# Patient Record
Sex: MALE | Race: WHITE | Employment: OTHER | URBAN - NONMETROPOLITAN AREA
[De-identification: names, ages, dates, MRNs, and addresses within clinical notes are randomized per-mention and may not be internally consistent; named-entity substitution may affect disease eponyms.]

---

## 2020-02-25 ENCOUNTER — APPOINTMENT (OUTPATIENT)
Dept: ULTRASOUND IMAGING | Age: 35
End: 2020-02-25

## 2020-02-25 ENCOUNTER — ANESTHESIA (OUTPATIENT)
Dept: OPERATING ROOM | Age: 35
End: 2020-02-25

## 2020-02-25 ENCOUNTER — HOSPITAL ENCOUNTER (OUTPATIENT)
Age: 35
Setting detail: OBSERVATION
Discharge: HOME | End: 2020-02-26
Attending: EMERGENCY MEDICINE | Admitting: SURGERY

## 2020-02-25 ENCOUNTER — ANESTHESIA EVENT (OUTPATIENT)
Dept: OPERATING ROOM | Age: 35
End: 2020-02-25

## 2020-02-25 ENCOUNTER — APPOINTMENT (OUTPATIENT)
Dept: CT IMAGING | Age: 35
End: 2020-02-25

## 2020-02-25 VITALS
SYSTOLIC BLOOD PRESSURE: 119 MMHG | OXYGEN SATURATION: 99 % | TEMPERATURE: 99 F | DIASTOLIC BLOOD PRESSURE: 64 MMHG | RESPIRATION RATE: 11 BRPM

## 2020-02-25 PROBLEM — K40.30 INGUINAL HERNIA OF LEFT SIDE WITH OBSTRUCTION: Status: ACTIVE | Noted: 2020-02-25

## 2020-02-25 LAB
ALBUMIN SERPL-MCNC: 5 G/DL (ref 3.5–5.1)
ALP BLD-CCNC: 103 U/L (ref 38–126)
ALT SERPL-CCNC: 19 U/L (ref 11–66)
ANION GAP SERPL CALCULATED.3IONS-SCNC: 17 MEQ/L (ref 8–16)
AST SERPL-CCNC: 20 U/L (ref 5–40)
BACTERIA: ABNORMAL /HPF
BASOPHILS # BLD: 0.2 %
BASOPHILS ABSOLUTE: 0 THOU/MM3 (ref 0–0.1)
BILIRUB SERPL-MCNC: 1 MG/DL (ref 0.3–1.2)
BILIRUBIN URINE: NEGATIVE
BLOOD, URINE: NEGATIVE
BUN BLDV-MCNC: 20 MG/DL (ref 7–22)
CALCIUM SERPL-MCNC: 10.5 MG/DL (ref 8.5–10.5)
CASTS 2: ABNORMAL /LPF
CASTS UA: ABNORMAL /LPF
CHARACTER, URINE: CLEAR
CHLORIDE BLD-SCNC: 95 MEQ/L (ref 98–111)
CO2: 28 MEQ/L (ref 23–33)
COLOR: ABNORMAL
CREAT SERPL-MCNC: 0.9 MG/DL (ref 0.4–1.2)
CRYSTALS, UA: ABNORMAL
EKG ATRIAL RATE: 72 BPM
EKG P AXIS: 53 DEGREES
EKG P-R INTERVAL: 130 MS
EKG Q-T INTERVAL: 374 MS
EKG QRS DURATION: 84 MS
EKG QTC CALCULATION (BAZETT): 409 MS
EKG R AXIS: 70 DEGREES
EKG T AXIS: 40 DEGREES
EKG VENTRICULAR RATE: 72 BPM
EOSINOPHIL # BLD: 0.2 %
EOSINOPHILS ABSOLUTE: 0 THOU/MM3 (ref 0–0.4)
EPITHELIAL CELLS, UA: ABNORMAL /HPF
ERYTHROCYTE [DISTWIDTH] IN BLOOD BY AUTOMATED COUNT: 10.8 % (ref 11.5–14.5)
ERYTHROCYTE [DISTWIDTH] IN BLOOD BY AUTOMATED COUNT: 36.9 FL (ref 35–45)
GFR SERPL CREATININE-BSD FRML MDRD: > 90 ML/MIN/1.73M2
GLUCOSE BLD-MCNC: 114 MG/DL (ref 70–108)
GLUCOSE URINE: NEGATIVE MG/DL
HCT VFR BLD CALC: 52.8 % (ref 42–52)
HEMOGLOBIN: 17.4 GM/DL (ref 14–18)
IMMATURE GRANS (ABS): 0.02 THOU/MM3 (ref 0–0.07)
IMMATURE GRANULOCYTES: 0.2 %
KETONES, URINE: 40
LEUKOCYTE ESTERASE, URINE: NEGATIVE
LIPASE: 21.5 U/L (ref 5.6–51.3)
LYMPHOCYTES # BLD: 11.7 %
LYMPHOCYTES ABSOLUTE: 1 THOU/MM3 (ref 1–4.8)
MCH RBC QN AUTO: 31 PG (ref 26–33)
MCHC RBC AUTO-ENTMCNC: 33 GM/DL (ref 32.2–35.5)
MCV RBC AUTO: 94.1 FL (ref 80–94)
MISCELLANEOUS 2: ABNORMAL
MONOCYTES # BLD: 7.3 %
MONOCYTES ABSOLUTE: 0.6 THOU/MM3 (ref 0.4–1.3)
NITRITE, URINE: NEGATIVE
NUCLEATED RED BLOOD CELLS: 0 /100 WBC
OSMOLALITY CALCULATION: 282.9 MOSMOL/KG (ref 275–300)
PH UA: 6.5 (ref 5–9)
PLATELET # BLD: 299 THOU/MM3 (ref 130–400)
PMV BLD AUTO: 9.7 FL (ref 9.4–12.4)
POTASSIUM REFLEX MAGNESIUM: 4.4 MEQ/L (ref 3.5–5.2)
PROTEIN UA: ABNORMAL
RBC # BLD: 5.61 MILL/MM3 (ref 4.7–6.1)
RBC URINE: ABNORMAL /HPF
RENAL EPITHELIAL, UA: ABNORMAL
SEG NEUTROPHILS: 80.4 %
SEGMENTED NEUTROPHILS ABSOLUTE COUNT: 6.6 THOU/MM3 (ref 1.8–7.7)
SODIUM BLD-SCNC: 140 MEQ/L (ref 135–145)
SPECIFIC GRAVITY, URINE: > 1.03 (ref 1–1.03)
TOTAL PROTEIN: 8.2 G/DL (ref 6.1–8)
UROBILINOGEN, URINE: 1 EU/DL (ref 0–1)
WBC # BLD: 8.2 THOU/MM3 (ref 4.8–10.8)
WBC UA: ABNORMAL /HPF
YEAST: ABNORMAL

## 2020-02-25 PROCEDURE — 6360000002 HC RX W HCPCS: Performed by: EMERGENCY MEDICINE

## 2020-02-25 PROCEDURE — 85025 COMPLETE CBC W/AUTO DIFF WBC: CPT

## 2020-02-25 PROCEDURE — 96372 THER/PROPH/DIAG INJ SC/IM: CPT

## 2020-02-25 PROCEDURE — 7100000000 HC PACU RECOVERY - FIRST 15 MIN: Performed by: SURGERY

## 2020-02-25 PROCEDURE — 49650 LAP ING HERNIA REPAIR INIT: CPT | Performed by: SURGERY

## 2020-02-25 PROCEDURE — 2500000003 HC RX 250 WO HCPCS: Performed by: ANESTHESIOLOGY

## 2020-02-25 PROCEDURE — 80053 COMPREHEN METABOLIC PANEL: CPT

## 2020-02-25 PROCEDURE — 6360000002 HC RX W HCPCS: Performed by: ANESTHESIOLOGY

## 2020-02-25 PROCEDURE — 3700000000 HC ANESTHESIA ATTENDED CARE: Performed by: SURGERY

## 2020-02-25 PROCEDURE — 2580000003 HC RX 258: Performed by: NURSE PRACTITIONER

## 2020-02-25 PROCEDURE — 2580000003 HC RX 258: Performed by: EMERGENCY MEDICINE

## 2020-02-25 PROCEDURE — 6360000004 HC RX CONTRAST MEDICATION: Performed by: EMERGENCY MEDICINE

## 2020-02-25 PROCEDURE — 6360000002 HC RX W HCPCS: Performed by: NURSE PRACTITIONER

## 2020-02-25 PROCEDURE — APPSS45 APP SPLIT SHARED TIME 31-45 MINUTES: Performed by: NURSE PRACTITIONER

## 2020-02-25 PROCEDURE — 99219 PR INITIAL OBSERVATION CARE/DAY 50 MINUTES: CPT | Performed by: SURGERY

## 2020-02-25 PROCEDURE — 93005 ELECTROCARDIOGRAM TRACING: CPT | Performed by: NURSE PRACTITIONER

## 2020-02-25 PROCEDURE — 83690 ASSAY OF LIPASE: CPT

## 2020-02-25 PROCEDURE — 2709999900 HC NON-CHARGEABLE SUPPLY: Performed by: SURGERY

## 2020-02-25 PROCEDURE — 2580000003 HC RX 258: Performed by: ANESTHESIOLOGY

## 2020-02-25 PROCEDURE — 74177 CT ABD & PELVIS W/CONTRAST: CPT

## 2020-02-25 PROCEDURE — 81001 URINALYSIS AUTO W/SCOPE: CPT

## 2020-02-25 PROCEDURE — 93010 ELECTROCARDIOGRAM REPORT: CPT | Performed by: INTERNAL MEDICINE

## 2020-02-25 PROCEDURE — 2709999900 HC NON-CHARGEABLE SUPPLY

## 2020-02-25 PROCEDURE — 3600000002 HC SURGERY LEVEL 2 BASE: Performed by: SURGERY

## 2020-02-25 PROCEDURE — G0378 HOSPITAL OBSERVATION PER HR: HCPCS

## 2020-02-25 PROCEDURE — 36415 COLL VENOUS BLD VENIPUNCTURE: CPT

## 2020-02-25 PROCEDURE — 3600000012 HC SURGERY LEVEL 2 ADDTL 15MIN: Performed by: SURGERY

## 2020-02-25 PROCEDURE — 3700000001 HC ADD 15 MINUTES (ANESTHESIA): Performed by: SURGERY

## 2020-02-25 PROCEDURE — C1781 MESH (IMPLANTABLE): HCPCS | Performed by: SURGERY

## 2020-02-25 PROCEDURE — 2500000003 HC RX 250 WO HCPCS: Performed by: SURGERY

## 2020-02-25 PROCEDURE — 76870 US EXAM SCROTUM: CPT

## 2020-02-25 PROCEDURE — 96374 THER/PROPH/DIAG INJ IV PUSH: CPT

## 2020-02-25 PROCEDURE — 99285 EMERGENCY DEPT VISIT HI MDM: CPT

## 2020-02-25 PROCEDURE — 7100000001 HC PACU RECOVERY - ADDTL 15 MIN: Performed by: SURGERY

## 2020-02-25 DEVICE — PLUG HERN M W1.3XL1.55IN INGUINAL POLYPR REP PRESHAPED: Type: IMPLANTABLE DEVICE | Site: GROIN | Status: FUNCTIONAL

## 2020-02-25 RX ORDER — MORPHINE SULFATE 4 MG/ML
4 INJECTION, SOLUTION INTRAMUSCULAR; INTRAVENOUS
Status: DISCONTINUED | OUTPATIENT
Start: 2020-02-25 | End: 2020-02-25

## 2020-02-25 RX ORDER — KETOROLAC TROMETHAMINE 30 MG/ML
30 INJECTION, SOLUTION INTRAMUSCULAR; INTRAVENOUS ONCE
Status: COMPLETED | OUTPATIENT
Start: 2020-02-25 | End: 2020-02-25

## 2020-02-25 RX ORDER — SODIUM CHLORIDE 0.9 % (FLUSH) 0.9 %
10 SYRINGE (ML) INJECTION EVERY 12 HOURS SCHEDULED
Status: DISCONTINUED | OUTPATIENT
Start: 2020-02-25 | End: 2020-02-26 | Stop reason: HOSPADM

## 2020-02-25 RX ORDER — MIDAZOLAM HYDROCHLORIDE 1 MG/ML
INJECTION INTRAMUSCULAR; INTRAVENOUS PRN
Status: DISCONTINUED | OUTPATIENT
Start: 2020-02-25 | End: 2020-02-25 | Stop reason: SDUPTHER

## 2020-02-25 RX ORDER — HYDROCODONE BITARTRATE AND ACETAMINOPHEN 5; 325 MG/1; MG/1
2 TABLET ORAL EVERY 4 HOURS PRN
Status: DISCONTINUED | OUTPATIENT
Start: 2020-02-25 | End: 2020-02-26 | Stop reason: HOSPADM

## 2020-02-25 RX ORDER — NEOSTIGMINE METHYLSULFATE 5 MG/5 ML
SYRINGE (ML) INTRAVENOUS PRN
Status: DISCONTINUED | OUTPATIENT
Start: 2020-02-25 | End: 2020-02-25 | Stop reason: SDUPTHER

## 2020-02-25 RX ORDER — ONDANSETRON 2 MG/ML
INJECTION INTRAMUSCULAR; INTRAVENOUS PRN
Status: DISCONTINUED | OUTPATIENT
Start: 2020-02-25 | End: 2020-02-25 | Stop reason: SDUPTHER

## 2020-02-25 RX ORDER — GLYCOPYRROLATE 1 MG/5 ML
SYRINGE (ML) INTRAVENOUS PRN
Status: DISCONTINUED | OUTPATIENT
Start: 2020-02-25 | End: 2020-02-25 | Stop reason: SDUPTHER

## 2020-02-25 RX ORDER — LIDOCAINE HYDROCHLORIDE 20 MG/ML
INJECTION, SOLUTION INFILTRATION; PERINEURAL PRN
Status: DISCONTINUED | OUTPATIENT
Start: 2020-02-25 | End: 2020-02-25 | Stop reason: SDUPTHER

## 2020-02-25 RX ORDER — MORPHINE SULFATE 2 MG/ML
2 INJECTION, SOLUTION INTRAMUSCULAR; INTRAVENOUS EVERY 5 MIN PRN
Status: DISCONTINUED | OUTPATIENT
Start: 2020-02-25 | End: 2020-02-26 | Stop reason: HOSPADM

## 2020-02-25 RX ORDER — ONDANSETRON 2 MG/ML
4 INJECTION INTRAMUSCULAR; INTRAVENOUS EVERY 6 HOURS PRN
Status: DISCONTINUED | OUTPATIENT
Start: 2020-02-25 | End: 2020-02-26 | Stop reason: HOSPADM

## 2020-02-25 RX ORDER — HYDROCODONE BITARTRATE AND ACETAMINOPHEN 5; 325 MG/1; MG/1
1 TABLET ORAL EVERY 4 HOURS PRN
Status: DISCONTINUED | OUTPATIENT
Start: 2020-02-25 | End: 2020-02-26 | Stop reason: HOSPADM

## 2020-02-25 RX ORDER — FENTANYL CITRATE 50 UG/ML
INJECTION, SOLUTION INTRAMUSCULAR; INTRAVENOUS PRN
Status: DISCONTINUED | OUTPATIENT
Start: 2020-02-25 | End: 2020-02-25 | Stop reason: SDUPTHER

## 2020-02-25 RX ORDER — SODIUM CHLORIDE 0.9 % (FLUSH) 0.9 %
10 SYRINGE (ML) INJECTION PRN
Status: DISCONTINUED | OUTPATIENT
Start: 2020-02-25 | End: 2020-02-26 | Stop reason: HOSPADM

## 2020-02-25 RX ORDER — 0.9 % SODIUM CHLORIDE 0.9 %
1000 INTRAVENOUS SOLUTION INTRAVENOUS ONCE
Status: COMPLETED | OUTPATIENT
Start: 2020-02-25 | End: 2020-02-25

## 2020-02-25 RX ORDER — MORPHINE SULFATE 2 MG/ML
2 INJECTION, SOLUTION INTRAMUSCULAR; INTRAVENOUS
Status: DISCONTINUED | OUTPATIENT
Start: 2020-02-25 | End: 2020-02-25

## 2020-02-25 RX ORDER — KETOROLAC TROMETHAMINE 30 MG/ML
15 INJECTION, SOLUTION INTRAMUSCULAR; INTRAVENOUS EVERY 6 HOURS PRN
Status: DISCONTINUED | OUTPATIENT
Start: 2020-02-25 | End: 2020-02-26 | Stop reason: HOSPADM

## 2020-02-25 RX ORDER — BUPIVACAINE HYDROCHLORIDE AND EPINEPHRINE 5; 5 MG/ML; UG/ML
INJECTION, SOLUTION EPIDURAL; INTRACAUDAL; PERINEURAL PRN
Status: DISCONTINUED | OUTPATIENT
Start: 2020-02-25 | End: 2020-02-25 | Stop reason: ALTCHOICE

## 2020-02-25 RX ORDER — ROCURONIUM BROMIDE 10 MG/ML
INJECTION, SOLUTION INTRAVENOUS PRN
Status: DISCONTINUED | OUTPATIENT
Start: 2020-02-25 | End: 2020-02-25 | Stop reason: SDUPTHER

## 2020-02-25 RX ORDER — LABETALOL 20 MG/4 ML (5 MG/ML) INTRAVENOUS SYRINGE
10 EVERY 10 MIN PRN
Status: DISCONTINUED | OUTPATIENT
Start: 2020-02-25 | End: 2020-02-26 | Stop reason: HOSPADM

## 2020-02-25 RX ORDER — MORPHINE SULFATE 2 MG/ML
2 INJECTION, SOLUTION INTRAMUSCULAR; INTRAVENOUS
Status: DISCONTINUED | OUTPATIENT
Start: 2020-02-25 | End: 2020-02-26 | Stop reason: HOSPADM

## 2020-02-25 RX ORDER — SODIUM CHLORIDE 9 MG/ML
INJECTION, SOLUTION INTRAVENOUS CONTINUOUS
Status: DISCONTINUED | OUTPATIENT
Start: 2020-02-25 | End: 2020-02-26

## 2020-02-25 RX ORDER — FENTANYL CITRATE 50 UG/ML
50 INJECTION, SOLUTION INTRAMUSCULAR; INTRAVENOUS EVERY 5 MIN PRN
Status: DISCONTINUED | OUTPATIENT
Start: 2020-02-25 | End: 2020-02-26 | Stop reason: HOSPADM

## 2020-02-25 RX ORDER — DEXAMETHASONE SODIUM PHOSPHATE 4 MG/ML
INJECTION, SOLUTION INTRA-ARTICULAR; INTRALESIONAL; INTRAMUSCULAR; INTRAVENOUS; SOFT TISSUE PRN
Status: DISCONTINUED | OUTPATIENT
Start: 2020-02-25 | End: 2020-02-25 | Stop reason: SDUPTHER

## 2020-02-25 RX ORDER — SODIUM CHLORIDE 9 MG/ML
INJECTION, SOLUTION INTRAVENOUS CONTINUOUS PRN
Status: DISCONTINUED | OUTPATIENT
Start: 2020-02-25 | End: 2020-02-25 | Stop reason: SDUPTHER

## 2020-02-25 RX ORDER — BUPIVACAINE HYDROCHLORIDE 5 MG/ML
INJECTION, SOLUTION PERINEURAL PRN
Status: DISCONTINUED | OUTPATIENT
Start: 2020-02-25 | End: 2020-02-25 | Stop reason: ALTCHOICE

## 2020-02-25 RX ORDER — PROPOFOL 10 MG/ML
INJECTION, EMULSION INTRAVENOUS PRN
Status: DISCONTINUED | OUTPATIENT
Start: 2020-02-25 | End: 2020-02-25 | Stop reason: SDUPTHER

## 2020-02-25 RX ORDER — CEFAZOLIN SODIUM 1 G/50ML
1 INJECTION, SOLUTION INTRAVENOUS
Status: COMPLETED | OUTPATIENT
Start: 2020-02-25 | End: 2020-02-26

## 2020-02-25 RX ORDER — KETOROLAC TROMETHAMINE 30 MG/ML
15 INJECTION, SOLUTION INTRAMUSCULAR; INTRAVENOUS EVERY 6 HOURS
Status: DISCONTINUED | OUTPATIENT
Start: 2020-02-25 | End: 2020-02-26 | Stop reason: HOSPADM

## 2020-02-25 RX ADMIN — DEXAMETHASONE SODIUM PHOSPHATE 10 MG: 4 INJECTION, SOLUTION INTRAMUSCULAR; INTRAVENOUS at 21:32

## 2020-02-25 RX ADMIN — SODIUM CHLORIDE 1000 ML: 9 INJECTION, SOLUTION INTRAVENOUS at 10:44

## 2020-02-25 RX ADMIN — KETOROLAC TROMETHAMINE 15 MG: 30 INJECTION, SOLUTION INTRAMUSCULAR at 18:52

## 2020-02-25 RX ADMIN — Medication 0.6 MG: at 22:28

## 2020-02-25 RX ADMIN — PROPOFOL 180 MG: 10 INJECTION, EMULSION INTRAVENOUS at 21:18

## 2020-02-25 RX ADMIN — KETOROLAC TROMETHAMINE 30 MG: 30 INJECTION, SOLUTION INTRAMUSCULAR at 09:12

## 2020-02-25 RX ADMIN — CEFAZOLIN 1 G: 1 INJECTION, POWDER, FOR SOLUTION INTRAMUSCULAR; INTRAVENOUS; PARENTERAL at 21:27

## 2020-02-25 RX ADMIN — LIDOCAINE HYDROCHLORIDE 100 MG: 20 INJECTION, SOLUTION INFILTRATION; PERINEURAL at 21:18

## 2020-02-25 RX ADMIN — CEFAZOLIN SODIUM 1 G: 1 INJECTION, SOLUTION INTRAVENOUS at 19:55

## 2020-02-25 RX ADMIN — FENTANYL CITRATE 100 MCG: 50 INJECTION INTRAMUSCULAR; INTRAVENOUS at 21:34

## 2020-02-25 RX ADMIN — MIDAZOLAM HYDROCHLORIDE 2 MG: 1 INJECTION, SOLUTION INTRAMUSCULAR; INTRAVENOUS at 21:18

## 2020-02-25 RX ADMIN — ROCURONIUM BROMIDE 40 MG: 10 INJECTION INTRAVENOUS at 21:19

## 2020-02-25 RX ADMIN — SODIUM CHLORIDE: 9 INJECTION, SOLUTION INTRAVENOUS at 21:12

## 2020-02-25 RX ADMIN — Medication 3 MG: at 22:28

## 2020-02-25 RX ADMIN — ONDANSETRON HYDROCHLORIDE 4 MG: 4 INJECTION, SOLUTION INTRAMUSCULAR; INTRAVENOUS at 22:26

## 2020-02-25 RX ADMIN — SODIUM CHLORIDE: 9 INJECTION, SOLUTION INTRAVENOUS at 14:55

## 2020-02-25 RX ADMIN — FENTANYL CITRATE 150 MCG: 50 INJECTION INTRAMUSCULAR; INTRAVENOUS at 21:18

## 2020-02-25 RX ADMIN — IOPAMIDOL 80 ML: 755 INJECTION, SOLUTION INTRAVENOUS at 12:34

## 2020-02-25 SDOH — HEALTH STABILITY: MENTAL HEALTH: HOW OFTEN DO YOU HAVE A DRINK CONTAINING ALCOHOL?: NEVER

## 2020-02-25 ASSESSMENT — PAIN DESCRIPTION - DESCRIPTORS: DESCRIPTORS: ACHING

## 2020-02-25 ASSESSMENT — PULMONARY FUNCTION TESTS
PIF_VALUE: 16
PIF_VALUE: 16
PIF_VALUE: 21
PIF_VALUE: 17
PIF_VALUE: 16
PIF_VALUE: 15
PIF_VALUE: 14
PIF_VALUE: 17
PIF_VALUE: 16
PIF_VALUE: 3
PIF_VALUE: 15
PIF_VALUE: 16
PIF_VALUE: 14
PIF_VALUE: 16
PIF_VALUE: 18
PIF_VALUE: 15
PIF_VALUE: 16
PIF_VALUE: 15
PIF_VALUE: 1
PIF_VALUE: 14
PIF_VALUE: 16
PIF_VALUE: 16
PIF_VALUE: 17
PIF_VALUE: 16
PIF_VALUE: 13
PIF_VALUE: 16
PIF_VALUE: 16
PIF_VALUE: 15
PIF_VALUE: 15
PIF_VALUE: 14
PIF_VALUE: 15
PIF_VALUE: 16
PIF_VALUE: 30
PIF_VALUE: 15
PIF_VALUE: 15
PIF_VALUE: 16
PIF_VALUE: 1
PIF_VALUE: 16
PIF_VALUE: 16
PIF_VALUE: 18
PIF_VALUE: 15
PIF_VALUE: 15
PIF_VALUE: 16
PIF_VALUE: 13
PIF_VALUE: 16
PIF_VALUE: 15
PIF_VALUE: 15
PIF_VALUE: 1
PIF_VALUE: 15
PIF_VALUE: 17
PIF_VALUE: 14
PIF_VALUE: 15
PIF_VALUE: 16
PIF_VALUE: 15
PIF_VALUE: 16
PIF_VALUE: 15
PIF_VALUE: 17
PIF_VALUE: 16
PIF_VALUE: 14
PIF_VALUE: 14
PIF_VALUE: 3
PIF_VALUE: 16
PIF_VALUE: 15
PIF_VALUE: 16
PIF_VALUE: 15
PIF_VALUE: 16
PIF_VALUE: 15
PIF_VALUE: 16

## 2020-02-25 ASSESSMENT — PAIN DESCRIPTION - PAIN TYPE: TYPE: ACUTE PAIN

## 2020-02-25 ASSESSMENT — ENCOUNTER SYMPTOMS
PHOTOPHOBIA: 0
ABDOMINAL PAIN: 1
COUGH: 0
VOMITING: 1
BLOOD IN STOOL: 0
SHORTNESS OF BREATH: 0
TROUBLE SWALLOWING: 0
BACK PAIN: 0
FACIAL SWELLING: 0
CHEST TIGHTNESS: 0
DIARRHEA: 0
VOICE CHANGE: 0
WHEEZING: 0
SORE THROAT: 0
NAUSEA: 0

## 2020-02-25 ASSESSMENT — PAIN DESCRIPTION - ONSET: ONSET: ON-GOING

## 2020-02-25 ASSESSMENT — PAIN SCALES - WONG BAKER: WONGBAKER_NUMERICALRESPONSE: 0

## 2020-02-25 ASSESSMENT — PAIN SCALES - GENERAL
PAINLEVEL_OUTOF10: 0
PAINLEVEL_OUTOF10: 0
PAINLEVEL_OUTOF10: 1
PAINLEVEL_OUTOF10: 4
PAINLEVEL_OUTOF10: 0
PAINLEVEL_OUTOF10: 0
PAINLEVEL_OUTOF10: 1
PAINLEVEL_OUTOF10: 0
PAINLEVEL_OUTOF10: 5

## 2020-02-25 ASSESSMENT — PAIN DESCRIPTION - LOCATION: LOCATION: ABDOMEN

## 2020-02-25 ASSESSMENT — PAIN DESCRIPTION - ORIENTATION: ORIENTATION: LEFT

## 2020-02-25 ASSESSMENT — PAIN DESCRIPTION - PROGRESSION: CLINICAL_PROGRESSION: GRADUALLY IMPROVING

## 2020-02-25 ASSESSMENT — PAIN DESCRIPTION - FREQUENCY: FREQUENCY: CONTINUOUS

## 2020-02-25 ASSESSMENT — PAIN - FUNCTIONAL ASSESSMENT: PAIN_FUNCTIONAL_ASSESSMENT: ACTIVITIES ARE NOT PREVENTED

## 2020-02-25 ASSESSMENT — PAIN DESCRIPTION - DIRECTION: RADIATING_TOWARDS: LOW

## 2020-02-25 NOTE — ED NOTES
ED to inpatient nurses report    Chief Complaint   Patient presents with    Abdominal Pain      Present to ED from home  LOC: alert and orientated to name, place, date  Vital signs   Vitals:    02/25/20 0809 02/25/20 0919 02/25/20 1045 02/25/20 1315   BP: (!) 155/102 (!) 153/101  136/88   Pulse: 89 78 85 99   Resp: 19 18 17 16   Temp: 98.4 °F (36.9 °C)      TempSrc: Oral      SpO2: 100% 100% 99% 99%      Oxygen Baseline 87% RA    Current needs required 2L O2 via NC Bipap/Cpap No  LDAs:   Peripheral IV 02/25/20 Right Forearm (Active)   Site Assessment Intact;Dry;Clean 2/25/2020  1:15 PM   Line Status Normal saline locked 2/25/2020  1:15 PM   Dressing Status Clean; Intact;Dry 2/25/2020  1:15 PM   Dressing Intervention New 2/25/2020 10:44 AM     Mobility: Independent  Pending ED orders: none  Present condition: stable    Electronically signed by Fawad Alcala RN on 2/25/2020 at 2:11 PM       Fawad Alcala RN  02/25/20 1948

## 2020-02-25 NOTE — H&P
any testicular swelling. Denies any melena or hematochezia. Denies any fevers or chills. Denies any SOB or chest pain. No abdominal surgeries. History of MS that he saw a specialist for when he was first diagnosed. Does not follow with anyone. Does not take any medications. Complains of very occasional lower extremities numbness/tingling and blurry vision at times. Past Medical History      Diagnosis Date    Multiple sclerosis [facility])      Past Surgical History  History reviewed. No pertinent surgical history. Medications  Prior to Admission medications    Not on File    Scheduled Meds:  Continuous Infusions:  PRN Meds:. Allergies  has No Known Allergies. Family History  family history is not on file. Social History   reports that he has never smoked. He does not have any smokeless tobacco history on file. He reports that he does not drink alcohol or use drugs. Review of Systems:  General Denies any fever or chills. No significant unexpected weight change. HEENT Denies any tinnitus or vertigo. Positive for blurry vision. No chronic headaches. Resp Denies any shortness of breath, cough or wheezing  Cardiac Denies any chest pain, palpitations, claudication or edema  GI Positive for diarrhea, nausea, vomiting, and abdominal pain. Denies any melena, hematochezia, hematemesis or pyrosis   Denies any frequency, urgency, hesitancy or incontinence  Heme Denies bruising or bleeding easily  Endocrine Denies any history of diabetes or thyroid disease  Neuro Denies any focal motor or sensory deficits  Musculoskeletal  Denies osteoarthritis. No gout. No weakness. Psychiatric  Denies any severe depression or agitation. No panic attacks. No suicidal ideation. OBJECTIVE:   CURRENT VITALS:  oral temperature is 98.4 °F (36.9 °C). His blood pressure is 136/88 and his pulse is 99. His respiration is 16 and oxygen saturation is 99%.    Temperature Range (24h):Temp: 98.4 °F (36.9 °C) Temp  Av.4 °F (36.9 °C)  Min: 98.4 °F (36.9 °C)  Max: 98.4 °F (36.9 °C)  BP Range (67F): Systolic (78MGT), RIY:224 , Min:136 , FZS:783     Diastolic (87QQZ), EJI:93, Min:88, Max:102    Pulse Range (24h): Pulse  Av.8  Min: 78  Max: 99  Respiration Range (24h): Resp  Av.5  Min: 16  Max: 19  Current Pulse Ox (24h):  SpO2: 99 %  Pulse Ox Range (24h):  SpO2  Av.5 %  Min: 99 %  Max: 100 %  Oxygen Amount and Delivery:    CONSTITUTIONAL: Alert and oriented times 3, no acute distress and cooperative to examination with proper mood and affect. SKIN: Skin color, texture, turgor normal. No rashes or lesions. LYMPH: no cervical nodes, no inguinal nodes  HEENT: Head is normocephalic, atraumatic. NECK: Supple, symmetrical, trachea midline  CHEST/LUNGS: normal respiratory rate and rhythm, lungs clear to auscultation without wheezes, rales or rhonchi. CARDIOVASCULAR: Heart sounds are normal. Regular rate and rhythm without murmur, gallop or rub. Normal S1 and S2. .   ABDOMEN: Normal shape. No scar(s) present. Normal bowel sounds. Soft, nondistended, no masses or organomegaly. Left inguinal hernia. Not reducible but no signs of strangulation. No erythema. Tenderness: lower left abdomen and groin. : left testicular tenderness  NEUROLOGIC: There are no focalizing motor or sensory deficits. CN II-XII are grossly intact. EXTREMITIES: no cyanosis, no clubbing and no edema.   LABS:     Recent Labs     20  0831 20  0913   WBC 8.2  --    HGB 17.4  --    HCT 52.8*  --      --      --    K 4.4  --    CL 95*  --    CO2 28  --    BUN 20  --    CREATININE 0.9  --    CALCIUM 10.5  --    AST 20  --    ALT 19  --    BILITOT 1.0  --    LIPASE 21.5  --    NITRU  --  NEGATIVE   COLORU  --  DK YELLOW*   BACTERIA  --  NONE SEEN     RADIOLOGY:   I have personally reviewed the following films:    PROCEDURE: CT ABDOMEN PELVIS W IV CONTRAST       CLINICAL INFORMATION: left flank pain/left groin pain .       COMPARISON: Scrotal ultrasound earlier today       TECHNIQUE: 5 mm axial CT images were obtained through the abdomen and pelvis after the administration of intravenous contrast. Coronal and sagittal reconstructions were obtained.       All CT scans at this facility use dose modulation, iterative reconstruction, and/or weight-based dosing when appropriate to reduce radiation dose to as low as reasonably achievable.       FINDINGS:       Lower chest: There is minimal dependent atelectasis in the right lower lobe.       Liver: Unremarkable. There is no liver mass or intrahepatic biliary dilatation.       Gallbladder/Biliary tree: Unremarkable. No calcified gallstones. No extrahepatic biliary dilatation.       Spleen: Unremarkable. No splenomegaly.       Pancreas: Unremarkable. No mass or pancreatic ductal dilatation. No findings to suggest acute pancreatitis.       Adrenal glands: There is a possible 9 x 8 mm left adrenal mass (axial image 22), statistically most likely an adenoma. Given the subcentimeter size, no follow-up necessary. Right adrenal is unremarkable.       Kidneys and ureters: Unremarkable. No hydroureteronephrosis, mass, or cyst. No renal or ureteral calculi. No findings to suggest acute pyelonephritis.       Stomach, small bowel, and colon: Stomach and duodenum are unremarkable. There are moderately dilated air and fluid-filled small bowel loops in the abdomen and pelvis consistent with small bowel obstruction. The obstruction occurs at the site of the left inguinal hernia which contains a loop of small bowel as well as a large    amount of fluid. The wall of the small bowel in the hernia is thickened. Colon is unremarkable.       Appendix: There is moderate dilatation of the proximal/mid appendix measuring up to 8 mm with focally thickened wall. There are no periappendiceal inflammatory changes.  Findings are indeterminate but cannot exclude early uncomplicated acute appendicitis.       Omentum and mesentery: Unremarkable     Aorta, vascular: No aortic aneurysm or dissection. No significant vascular abnormality.       Reproductive: Unremarkable       Bladder: Unremarkable. No wall thickening or obvious mass. No calcified stones.       Intraperitoneal/retroperitoneal Space: There is a small amount of fluid in the right lower quadrant, pelvis and in the left inguinal hernia. There is no abscess, adenopathy, or mass. No pneumoperitoneum.       Abdominal and pelvic body wall soft tissues: There is a tiny fat-containing umbilical hernia.       Musculoskeletal structures: Unremarkable. No significant bone or joint abnormality.           Impression   Left inguinal hernia containing a loop of small bowel with associated small bowel obstruction. Moderate amount of fluid in the left inguinal hernia and mild amount of fluid in the right lower quadrant and pelvis. Focal moderate dilatation of the proximal/mid appendix with wall thickening. No periappendiceal inflammation. Findings indeterminate for acute appendicitis. Correlate clinically.       Results discussed with Dr. Danielle Bland on 2/25/2020 at 1:06 PM.       **This report has been created using voice recognition software. It may contain minor errors which are inherent in voice recognition technology. **       Final report electronically signed by Dr. Kamari Gonzáles on 2/25/2020 1:06 PM     PROCEDURE: US SCROTUM AND TESTICLES       CLINICAL INFORMATION: left testicular pain .       COMPARISON: No prior study.       TECHNIQUE: Grayscale and color flow and spectral Doppler sonographic imaging of the scrotum was performed in the longitudinal and transverse planes.       FINDINGS:        Testes: The testicles are of normal size and homogeneous echogenicity without a mass. No evidence of orchitis. The right testicle measures 4.5 x 2.7 x 2.2 cm.    The left testicle measures 4.6 x 3.1 x 2.4 cm.       Testicular vascularity: Arterial and venous blood flow are seen within both testicles, no evidence surgery. Planning on surgery tonight. All questions answered.     Electronically signed by David Mojica MD on 2/25/2020 at 7:40 PM

## 2020-02-25 NOTE — ED NOTES
Dr. Dmitry Fontaine at bedside assessing pt at this time.      Diya Antonio, EDENILSON  02/25/20 3611

## 2020-02-25 NOTE — ED NOTES
Pt medicated for pain. Urine specimen sent. Pt updated on POC at this time. Resting on cot and call light within reach.      Nona Fam RN  02/25/20 5738

## 2020-02-25 NOTE — ED PROVIDER NOTES
of children: None    Years of education: None    Highest education level: None   Occupational History    None   Social Needs    Financial resource strain: None    Food insecurity:     Worry: None     Inability: None    Transportation needs:     Medical: None     Non-medical: None   Tobacco Use    Smoking status: Never Smoker   Substance and Sexual Activity    Alcohol use: Never     Frequency: Never    Drug use: Never    Sexual activity: None   Lifestyle    Physical activity:     Days per week: None     Minutes per session: None    Stress: None   Relationships    Social connections:     Talks on phone: None     Gets together: None     Attends Worship service: None     Active member of club or organization: None     Attends meetings of clubs or organizations: None     Relationship status: None    Intimate partner violence:     Fear of current or ex partner: None     Emotionally abused: None     Physically abused: None     Forced sexual activity: None   Other Topics Concern    None   Social History Narrative    None       REVIEW OF SYSTEMS       Review of Systems   Constitutional: Positive for appetite change (decreased). Negative for chills, diaphoresis and fever. HENT: Negative for facial swelling, sore throat, trouble swallowing and voice change. Eyes: Negative for photophobia and visual disturbance. Respiratory: Negative for cough, chest tightness, shortness of breath and wheezing. Cardiovascular: Negative for chest pain, palpitations and leg swelling. Gastrointestinal: Positive for abdominal pain and vomiting (x1 last evening - none since). Negative for blood in stool, diarrhea and nausea. Endocrine: Negative for polydipsia and polyuria. Genitourinary: Positive for testicular pain (left). Negative for difficulty urinating, dysuria, flank pain, frequency, hematuria and urgency. Musculoskeletal: Negative for back pain, neck pain and neck stiffness.    Skin: Negative for pallor and rash.   Neurological: Negative for seizures, speech difficulty, weakness, numbness and headaches. Hematological: Does not bruise/bleed easily. Psychiatric/Behavioral: Negative for confusion. The patient is not nervous/anxious. Except as noted above the remainder of the review of systemswasreviewed and is negative. PHYSICAL EXAM    (up to 7 for level 4, 8 or more for level 5)     ED Triage Vitals [02/25/20 0809]   BP Temp Temp Source Pulse Resp SpO2 Height Weight   (!) 155/102 98.4 °F (36.9 °C) Oral 89 19 100 % -- --       Physical Exam  Vitals signs and nursing note reviewed. Constitutional:       Appearance: He is well-developed. HENT:      Head: Normocephalic. Right Ear: External ear normal.      Left Ear: External ear normal.      Nose: Nose normal.      Mouth/Throat:      Mouth: Mucous membranes are moist.   Eyes:      Conjunctiva/sclera: Conjunctivae normal.      Pupils: Pupils are equal, round, and reactive to light. Neck:      Musculoskeletal: Neck supple. Trachea: No tracheal deviation. Cardiovascular:      Rate and Rhythm: Normal rate and regular rhythm. Pulmonary:      Effort: Pulmonary effort is normal.      Breath sounds: Normal breath sounds. Abdominal:      Tenderness: There is abdominal tenderness in the suprapubic area and left lower quadrant. There is no right CVA tenderness or left CVA tenderness. Musculoskeletal: Normal range of motion. Skin:     General: Skin is warm and dry. Neurological:      Mental Status: He is alert and oriented to person, place, and time. Cranial Nerves: No cranial nerve deficit.        DIAGNOSTIC RESULTS     EKG: (none if blank)  All EKG's are interpreted by the Emergency Department Physician who either signs or Co-signs this chart in the absence of a cardiologist.    None     RADIOLOGY: (none if blank)  Interpretation per the Radiologist below, if available at the time of this note:    CT ABDOMEN PELVIS W IV CONTRAST Additional Contrast? None   Final Result   Left inguinal hernia containing a loop of small bowel with associated small bowel obstruction. Moderate amount of fluid in the left inguinal hernia and mild amount of fluid in the right lower quadrant and pelvis. Focal moderate dilatation of the proximal/mid appendix with wall thickening. No periappendiceal inflammation. Findings indeterminate for acute appendicitis. Correlate clinically. Results discussed with Dr. Jillian Gambino on 2/25/2020 at 1:06 PM.      **This report has been created using voice recognition software. It may contain minor errors which are inherent in voice recognition technology. **      Final report electronically signed by Dr. Seda English on 2/25/2020 1:06 PM      [address]   Final Result    No evidence of testicular torsion, epididymoorchitis, or mass. Left inguinal hernia containing a large amount of fluid as well as a peristalsing bowel loop extending into the superior aspect of the left hemiscrotum. .   Small bilateral hydroceles with debris. 2 mm right epididymal head cyst or spermatocele. Possible small left varicocele. **This report has been created using voice recognition software. It may contain minor errors which are inherent in voice recognition technology. **      Final report electronically signed by Dr. Seda English on 2/25/2020 12:27 PM          LABS:  Labs Reviewed   CBC WITH AUTO DIFFERENTIAL - Abnormal; Notable for the following components:       Result Value    Hematocrit 52.8 (*)     MCV 94.1 (*)     RDW-CV 10.8 (*)     All other components within normal limits   COMPREHENSIVE METABOLIC PANEL W/ REFLEX TO MG FOR LOW K - Abnormal; Notable for the following components:    Glucose 114 (*)     Chloride 95 (*)     Total Protein 8.2 (*)     All other components within normal limits   ANION GAP - Abnormal; Notable for the following components:    Anion Gap 17.0 (*)     All other components within normal limits URINE WITH REFLEXED MICRO - Abnormal; Notable for the following components:    Ketones, Urine 40 (*)     Specific Gravity, Urine > 1.030 (*)     Protein, UA TRACE (*)     Color, UA DK YELLOW (*)     All other components within normal limits   LIPASE   GLOMERULAR FILTRATION RATE, ESTIMATED   OSMOLALITY       All other labs were within normalrange ornot returned as of this dictation. EMERGENCY DEPARTMENT COURSE and Medical Decision Making:     MDM/  ED Course as of Feb 25 1323   Tue Feb 25, 2020   1312 Discussed w/ Dr Jerome Mercado, gen surg on call who agrees to admit and fix    [AB]      ED Course User Index  [AB] Salinas Plascencia DO       9215 Labs ordered. Toradol given    1022 US and CT abdomen pelvis ordered    Pt has an incarcerated hernia with a bowel obstruction  Case referred to surgery immediately after CT findings    ED Medications administered this visit:    Medications   ketorolac (TORADOL) injection 30 mg (30 mg Intramuscular Given 2/25/20 0912)   0.9 % sodium chloride bolus (0 mLs Intravenous Stopped 2/25/20 1315)   iopamidol (ISOVUE-370) 76 % injection 80 mL (80 mLs Intravenous Given 2/25/20 1234)       CONSULTS: (None if blank)  None    Procedures:(None ifblank)  None      CLINICAL IMPRESSION      1. Incarcerated inguinal hernia          DISPOSITION/PLAN   DISPOSITION        PATIENTREFERRED TO:  No follow-up provider specified. DISCHARGE MEDICATIONS:  New Prescriptions    No medications on file              (Pleasenote that portions of this note were completed with a voice recognition program.  Efforts were made to edit the dictations but occasionally words are mis-transcribed.)    Scribe:  Jonathan Mcmillan 2/25/20 8:14 AM Scribing for and in the presence of Salinas Plascencia DO.     Signed by: Israel Ferraro, 02/25/20 1:23 PM    Provider:  I personally performed the services described in the documentation, reviewedand edited thedocumentation which was dictated to the scribe in my presence, and it accurately records my words and actions.     Julienne Moses DO 2/25/20 1:23 PM         Julienne Moses DO, FACEP (electronically signed)  Attending Emergency Physician        Julienne Moses DO  03/02/20 1841

## 2020-02-26 VITALS
OXYGEN SATURATION: 100 % | HEART RATE: 70 BPM | RESPIRATION RATE: 16 BRPM | BODY MASS INDEX: 23.25 KG/M2 | DIASTOLIC BLOOD PRESSURE: 70 MMHG | SYSTOLIC BLOOD PRESSURE: 127 MMHG | TEMPERATURE: 98.4 F | WEIGHT: 166.1 LBS | HEIGHT: 71 IN

## 2020-02-26 PROBLEM — K40.30 INCARCERATED INGUINAL HERNIA: Status: RESOLVED | Noted: 2020-02-25 | Resolved: 2020-02-26

## 2020-02-26 LAB
ANION GAP SERPL CALCULATED.3IONS-SCNC: 13 MEQ/L (ref 8–16)
BUN BLDV-MCNC: 18 MG/DL (ref 7–22)
CALCIUM SERPL-MCNC: 8.9 MG/DL (ref 8.5–10.5)
CHLORIDE BLD-SCNC: 104 MEQ/L (ref 98–111)
CO2: 22 MEQ/L (ref 23–33)
CREAT SERPL-MCNC: 0.7 MG/DL (ref 0.4–1.2)
GFR SERPL CREATININE-BSD FRML MDRD: > 90 ML/MIN/1.73M2
GLUCOSE BLD-MCNC: 144 MG/DL (ref 70–108)
HCT VFR BLD CALC: 43.2 % (ref 42–52)
HEMOGLOBIN: 14 GM/DL (ref 14–18)
POTASSIUM REFLEX MAGNESIUM: 4.8 MEQ/L (ref 3.5–5.2)
SODIUM BLD-SCNC: 139 MEQ/L (ref 135–145)

## 2020-02-26 PROCEDURE — 6360000002 HC RX W HCPCS: Performed by: SURGERY

## 2020-02-26 PROCEDURE — 2580000003 HC RX 258: Performed by: SURGERY

## 2020-02-26 PROCEDURE — 99024 POSTOP FOLLOW-UP VISIT: CPT | Performed by: SURGERY

## 2020-02-26 PROCEDURE — 96376 TX/PRO/DX INJ SAME DRUG ADON: CPT

## 2020-02-26 PROCEDURE — 85014 HEMATOCRIT: CPT

## 2020-02-26 PROCEDURE — 80048 BASIC METABOLIC PNL TOTAL CA: CPT

## 2020-02-26 PROCEDURE — 85018 HEMOGLOBIN: CPT

## 2020-02-26 PROCEDURE — 94761 N-INVAS EAR/PLS OXIMETRY MLT: CPT

## 2020-02-26 PROCEDURE — G0378 HOSPITAL OBSERVATION PER HR: HCPCS

## 2020-02-26 PROCEDURE — 99024 POSTOP FOLLOW-UP VISIT: CPT | Performed by: NURSE PRACTITIONER

## 2020-02-26 PROCEDURE — 96372 THER/PROPH/DIAG INJ SC/IM: CPT

## 2020-02-26 PROCEDURE — 36415 COLL VENOUS BLD VENIPUNCTURE: CPT

## 2020-02-26 RX ORDER — SODIUM CHLORIDE 9 MG/ML
INJECTION, SOLUTION INTRAVENOUS CONTINUOUS
Status: DISCONTINUED | OUTPATIENT
Start: 2020-02-26 | End: 2020-02-26

## 2020-02-26 RX ORDER — KETOROLAC TROMETHAMINE 10 MG/1
10 TABLET, FILM COATED ORAL EVERY 8 HOURS PRN
Qty: 20 TABLET | Refills: 0 | Status: SHIPPED | OUTPATIENT
Start: 2020-02-26 | End: 2020-03-11

## 2020-02-26 RX ORDER — PROMETHAZINE HYDROCHLORIDE 25 MG/1
12.5 TABLET ORAL EVERY 6 HOURS PRN
Status: DISCONTINUED | OUTPATIENT
Start: 2020-02-26 | End: 2020-02-26 | Stop reason: HOSPADM

## 2020-02-26 RX ORDER — HYDROCODONE BITARTRATE AND ACETAMINOPHEN 5; 325 MG/1; MG/1
2 TABLET ORAL EVERY 4 HOURS PRN
Status: DISCONTINUED | OUTPATIENT
Start: 2020-02-26 | End: 2020-02-26

## 2020-02-26 RX ORDER — HYDROCODONE BITARTRATE AND ACETAMINOPHEN 5; 325 MG/1; MG/1
1 TABLET ORAL EVERY 4 HOURS PRN
Status: DISCONTINUED | OUTPATIENT
Start: 2020-02-26 | End: 2020-02-26

## 2020-02-26 RX ADMIN — ENOXAPARIN SODIUM 40 MG: 40 INJECTION SUBCUTANEOUS at 10:11

## 2020-02-26 RX ADMIN — Medication 10 ML: at 10:17

## 2020-02-26 RX ADMIN — KETOROLAC TROMETHAMINE 15 MG: 30 INJECTION, SOLUTION INTRAMUSCULAR at 00:09

## 2020-02-26 RX ADMIN — KETOROLAC TROMETHAMINE 15 MG: 30 INJECTION, SOLUTION INTRAMUSCULAR at 05:11

## 2020-02-26 RX ADMIN — KETOROLAC TROMETHAMINE 15 MG: 30 INJECTION, SOLUTION INTRAMUSCULAR at 10:14

## 2020-02-26 ASSESSMENT — PAIN SCALES - GENERAL
PAINLEVEL_OUTOF10: 0
PAINLEVEL_OUTOF10: 1
PAINLEVEL_OUTOF10: 1
PAINLEVEL_OUTOF10: 0
PAINLEVEL_OUTOF10: 2
PAINLEVEL_OUTOF10: 2
PAINLEVEL_OUTOF10: 0
PAINLEVEL_OUTOF10: 2
PAINLEVEL_OUTOF10: 1
PAINLEVEL_OUTOF10: 2

## 2020-02-26 NOTE — BRIEF OP NOTE
Brief Postoperative Note  ______________________________________________________________    Patient: Miley Turpin  YOB: 1985  MRN: [id number]  Date of Procedure: 2/25/2020    Pre-Op Diagnosis: pSBO, incarcerated left inguinal hernia    Post-Op Diagnosis: Same       Procedure(s):  OPEN INCARCERATED LEFT INGUINAL HERNIA REPAIR WITH MESH    Anesthesia: General/local    Surgeon(s):  Cm Silva MD    Estimated Blood Loss (mL): 10 ml    Complications: None    Specimens:   * No specimens in log *    Implants:  Implant Name Type Inv.  Item Serial No.  Lot No. LRB No. Used   MESH SURG WILLARD PLUG PERFIX MED 1.3X1.55IN Mesh MESH SURG WILLARD PLUG PERFIX MED 1.3X1.55IN  [facility] [id number] Left 1         Drains:   Urethral Catheter Non-latex 16 fr (Active)       Findings: as above - see Op Note    Cm Silva MD  Date: 2/25/2020  Time: 10:32 PM

## 2020-02-26 NOTE — DISCHARGE SUMMARY
started to migrate to left lower quadrant and now radiates to left groin/testicle. Pain is now sharp, severe with movement. Rest helps. Pain has been unbearable since Saturday/Sunday. Patient did not know he had a hernia. Patient states he has had normal bowel function until this weekend. Sunday he had diarrhea. Associated nausea and vomiting. Appetite decreased the last 2-3 days. Last meal was yesterday. Denies any urinary complaints. Denies any testicular swelling. Denies any melena or hematochezia. Denies any fevers or chills. Denies any SOB or chest pain. No abdominal surgeries. History of MS that he saw a specialist for when he was first diagnosed. Does not follow with anyone. Does not take any medications. Complains of very occasional lower extremities numbness/tingling and blurry vision at times.      Past Medical History  Past Medical History             Diagnosis Date    Multiple sclerosis [facility])           Past Surgical History  Past Surgical History   History reviewed. No pertinent surgical history. Medications  Home Medications   Prior to Admission medications    Not on File       Scheduled Meds:  Continuous Infusions:  PRN Meds:. Allergies  has No Known Allergies. Family History  family history is not on file. Social History   reports that he has never smoked. He does not have any smokeless tobacco history on file. He reports that he does not drink alcohol or use drugs. Review of Systems:  General Denies any fever or chills. No significant unexpected weight change. HEENT Denies any tinnitus or vertigo. Positive for blurry vision. No chronic headaches. Resp Denies any shortness of breath, cough or wheezing  Cardiac Denies any chest pain, palpitations, claudication or edema  GI Positive for diarrhea, nausea, vomiting, and abdominal pain.  Denies any melena, hematochezia, hematemesis or pyrosis   Denies any frequency, urgency, hesitancy or incontinence  Heme Denies bruising or bleeding easily  Endocrine Denies any history of diabetes or thyroid disease  Neuro Denies any focal motor or sensory deficits  Musculoskeletal  Denies osteoarthritis. No gout. No weakness. Psychiatric  Denies any severe depression or agitation. No panic attacks. No suicidal ideation. OBJECTIVE:   CURRENT VITALS:  oral temperature is 98.4 °F (36.9 °C). His blood pressure is 136/88 and his pulse is 99. His respiration is 16 and oxygen saturation is 99%. Temperature Range (24h):Temp: 98.4 °F (36.9 °C) Temp  Av.4 °F (36.9 °C)  Min: 98.4 °F (36.9 °C)  Max: 98.4 °F (36.9 °C)  BP Range (73B): Systolic (63JOE), PMU:971 , Min:136 , EWN:689     Diastolic (29KNA), TZZ:19, Min:88, Max:102     Pulse Range (24h): Pulse  Av.8  Min: 78  Max: 99  Respiration Range (24h): Resp  Av.5  Min: 16  Max: 19  Current Pulse Ox (24h):  SpO2: 99 %  Pulse Ox Range (24h):  SpO2  Av.5 %  Min: 99 %  Max: 100 %  Oxygen Amount and Delivery:    CONSTITUTIONAL: Alert and oriented times 3, no acute distress and cooperative to examination with proper mood and affect. SKIN: Skin color, texture, turgor normal. No rashes or lesions. LYMPH: no cervical nodes, no inguinal nodes  HEENT: Head is normocephalic, atraumatic. NECK: Supple, symmetrical, trachea midline  CHEST/LUNGS: normal respiratory rate and rhythm, lungs clear to auscultation without wheezes, rales or rhonchi. CARDIOVASCULAR: Heart sounds are normal. Regular rate and rhythm without murmur, gallop or rub. Normal S1 and S2. .   ABDOMEN: Normal shape. No scar(s) present. Normal bowel sounds. Soft, nondistended, no masses or organomegaly. Left inguinal hernia. Not reducible but no signs of strangulation. No erythema. Tenderness: lower left abdomen and groin. : left testicular tenderness  NEUROLOGIC: There are no focalizing motor or sensory deficits. CN II-XII are grossly intact. EXTREMITIES: no cyanosis, no clubbing and no edema.   LABS:           Recent Labs 02/25/20  0831 02/25/20  0913   WBC 8.2  --    HGB 17.4  --    HCT 52.8*  --      --      --    K 4.4  --    CL 95*  --    CO2 28  --    BUN 20  --    CREATININE 0.9  --    CALCIUM 10.5  --    AST 20  --    ALT 19  --    BILITOT 1.0  --    LIPASE 21.5  --    NITRU  --  NEGATIVE   COLORU  --  DK YELLOW*   BACTERIA  --  NONE SEEN      RADIOLOGY:   I have personally reviewed the following films:     PROCEDURE: CT ABDOMEN PELVIS W IV CONTRAST       CLINICAL INFORMATION: left flank pain/left groin pain .       COMPARISON: Scrotal ultrasound earlier today       TECHNIQUE: 5 mm axial CT images were obtained through the abdomen and pelvis after the administration of intravenous contrast. Coronal and sagittal reconstructions were obtained.       All CT scans at this facility use dose modulation, iterative reconstruction, and/or weight-based dosing when appropriate to reduce radiation dose to as low as reasonably achievable.       FINDINGS:       Lower chest: There is minimal dependent atelectasis in the right lower lobe.       Liver: Unremarkable. There is no liver mass or intrahepatic biliary dilatation.       Gallbladder/Biliary tree: Unremarkable. No calcified gallstones. No extrahepatic biliary dilatation.       Spleen: Unremarkable. No splenomegaly.       Pancreas: Unremarkable. No mass or pancreatic ductal dilatation. No findings to suggest acute pancreatitis.       Adrenal glands: There is a possible 9 x 8 mm left adrenal mass (axial image 22), statistically most likely an adenoma. Given the subcentimeter size, no follow-up necessary. Right adrenal is unremarkable.       Kidneys and ureters: Unremarkable. No hydroureteronephrosis, mass, or cyst. No renal or ureteral calculi. No findings to suggest acute pyelonephritis.       Stomach, small bowel, and colon: Stomach and duodenum are unremarkable.    There are moderately dilated air and fluid-filled small bowel loops in the abdomen and pelvis consistent with the need for reoperation, severe chronic  postoperative pain or numbness, major vascular or nerve injury,  cardiopulmonary complications, anesthetic complications, seroma/hematoma  formation, wound breakdown, mesh infection requiring the removal of the  mesh, recurrence of the hernia, chronic groin pain, ischemic orchitis  resulting in loss of testicle and death. After all of the questions  were answered in their entirety and the patient was completely aware of  the current situation, he and his wife elected to proceed with the  procedure.     DESCRIPTION OF PROCEDURE:  After informed consent was signed and placed  on the chart, the patient was taken back to the operating room, placed  supine on the operating room table. General anesthesia was induced. He  tolerated this well throughout the case. All pressure points were  padded. He was on preoperative antibiotics. Bilateral lower extremity  sequential compression devices were placed prior to incision. His  abdomen, pelvis and scrotal region were all prepped and draped in the  usual sterile standard fashion. A timeout occurred prior to the  operation which not only identified the patient, but also the planned  procedure to be performed. At the end of the timeout, there were no  questions or concerns. Upon induction and then with prepping, the  patient was then able to actually have reduction for the first time in  its entirety there in the left groin.     I began the operation first by making a left oblique incision there in  the left groin. I began just lateral from the pubic tubercle and  extended up towards the superior-anterior iliac spine. This was  deepened through the deep dermal and subcutaneous tissues. Christal's  fascia was dissected through. External oblique aponeurosis was  identified. This was fairly attenuated and blown through due to the  large nature of the hernia size.   This was opened up with sharp  dissection in a longitudinal fashion with Metzenbaum scissors. Ilioinguinal nerve was identified and protected throughout the rest of  the operation. Cord structure was identified, circumferentially  dissected free there close to the pubic tubercle and encircled with half  inch Penrose drain. Cord structures were then  from the  indirect hernia sac which was quite thick and large. Eventually, this  was able to be reduced and cleaned up down to its base. Here, it was  then transected and ligated and allowed to be reduced back into the  intraabdominal cavity. Irrigation. Hemostasis was obtained with  electrocautery.     Defect there at the internal ring was then obliterated with a medium  plug. This was secured with multiple interrupted 0 PDS sutures. Patch  was then also placed. This was first secured at the pubic tubercle. It  was then secured inferiorly along the inguinal ligament showing portion  and then also superiorly along the standard landmarks. Cord was placed  into the keyhole and the keyhole closed. Care was taken to avoid any  cord entrapment. Flaps were placed laterally. During suture placement,  care was taken to avoid neurovascular bundles as well as the cord  structures.     After the hernia sac had been transected and then the stump reduced and  the internal ring shrunk up to its normal size with the medium plug and  then the patch placement for reinforcement, the groin overall looked  good. Hemostasis was adequate. Half inch Penrose drain was removed. External oblique aponeurosis was then reapproximated with multiple  interrupted 3-0 Vicryl sutures. Christal's fascia was reapproximated with  multiple interrupted 3-0 Vicryl sutures. Skin was then reapproximated  with running 4-0 Vicryl in a subcuticular fashion. Closed incisions  were then cleaned, dried, and Steri-Strips applied. 0.5% Marcaine was  used for local anesthetic.   The patient tolerated the injection of local  anesthetic without a day as needed. [x] May wash over incision in shower, but do not soak in a bath.  [] Take sitz bath for 20 minutes twice daily and after bowel movements. [x] Keep the abdominal binder in place during the day. May remove to shower and at night. ABDOMINAL/LAPAROSCOPIC SURGERY  [x]You are encouraged to get up and move around as this helps with circulation, prevents blood clots from forming and speeds up the healing process. Call the office if you develop pain or swelling in your legs. Do not massage sore muscles in the legs. [x]Breath deeply and cough from time to time. This helps to clear your lungs and helps prevent pneumonia. [x]Supporting your incision with a pillow or your hand helps to minimize discomfort and pain. [x]Laparoscopic patients may develop shoulder pain in the first 48 hours from the gas used during the procedure a heating pad may help alleviate this discomfort. FOLLOW-UP CARE. SPECIFICALLY WATCH FOR:   Fever over 101 degrees by mouth   Increased redness, warmth, hardness at operative site. Blood soaked dressing (small amounts of oozing may be normal.)   Increased or progressive drainage from the surgical area   Inability to urinate or blood in the urine   Pain not relieved by the medications ordered   Persistent nausea and/or vomiting, unable to retain fluids. Pain or swelling in your legs. Shortness of breath. Call the office if you develop any of the above symptoms. FOLLOW-UP APPOINTMENT   []1 week   [x]2 weeks:    []Other    Call my office if you have any problem that concerns you [phone]. After hours, you can reach the answering service via the office phone number. IF YOU NEED IMMEDIATE ATTENTION, GO TO THE EMERGENCY ROOM AND YOUR DOCTOR WILL BE CONTACTED. Prepared by Lane Nieto CNP for   Justa Fairchild MD [phone]    Discharge Medications:        Medication List      START taking these medications    ketorolac 10 MG tablet  Commonly

## 2020-02-26 NOTE — ANESTHESIA PRE PROCEDURE
Department of Anesthesiology  Preprocedure Note       Name:  Caridad López   Age:  29 y.o.  :  1985                                          MRN:  [id number]         Date:  2020      Surgeon: Julia Dupont):  Jun Cortez MD    Procedure: OPEN LEFT INGUINAL HERNIA REPAIR POSS BOWEL (Left Groin)    Medications prior to admission:   Prior to Admission medications    Not on File       Current medications:    Current Facility-Administered Medications   Medication Dose Route Frequency Provider Last Rate Last Dose    sodium chloride flush 0.9 % injection 10 mL  10 mL Intravenous 2 times per day Rick Beach, APRN - CNP        sodium chloride flush 0.9 % injection 10 mL  10 mL Intravenous PRN Penn Laird Beach, APRN - CNP        0.9 % sodium chloride infusion   Intravenous Continuous Rick Beach, APRN -  mL/hr at 20 1455      HYDROcodone-acetaminophen (NORCO) 5-325 MG per tablet 1 tablet  1 tablet Oral Q4H PRN Penn Laird Beach, APRN - CNP        Or    HYDROcodone-acetaminophen (NORCO) 5-325 MG per tablet 2 tablet  2 tablet Oral Q4H PRN Penn Laird Beach, APRN - CNP        ondansetron [facility]) injection 4 mg  4 mg Intravenous Q6H PRN Rick Beach, APRN - CNP        ketorolac (TORADOL) injection 15 mg  15 mg Intravenous Q6H PRN Penn Laird Beach, APRN - CNP   15 mg at 20 6950    morphine (PF) injection 2 mg  2 mg Intravenous Q2H PRN Penn Laird Beach, APRN - CNP           Allergies:  No Known Allergies    Problem List:    Patient Active Problem List   Diagnosis Code    Incarcerated inguinal hernia K40.30       Past Medical History:        Diagnosis Date    Multiple sclerosis ([facility] [address]        Past Surgical History:  History reviewed. No pertinent surgical history.     Social History:    Social History     Tobacco Use    Smoking status: Never Smoker   Substance Use Topics    Alcohol use: Never     Frequency: Never                                Counseling given: Not Answered      Vital Signs (Current):   Vitals:    02/25/20 1045 02/25/20 1315 02/25/20 1426 02/25/20 2000   BP:  136/88 130/73 121/69   Pulse: 85 99 81 88   Resp: 17 16 16 16   Temp:   99 °F (37.2 °C) 99.1 °F (37.3 °C)   TempSrc:   Oral Oral   SpO2: 99% 99% 98% 95%   Weight:   166 lb 1.6 oz (75.3 kg)    Height:   5' 11\" (1.803 m)                                               BP Readings from Last 3 Encounters:   02/25/20 121/69       NPO Status:                                                                                 BMI:   Wt Readings from Last 3 Encounters:   02/25/20 166 lb 1.6 oz (75.3 kg)     Body mass index is 23.17 kg/m². CBC:   Lab Results   Component Value Date    WBC 8.2 02/25/2020    RBC 5.61 02/25/2020    HGB 17.4 02/25/2020    HCT 52.8 02/25/2020    MCV 94.1 02/25/2020     02/25/2020       CMP:   Lab Results   Component Value Date     02/25/2020    K 4.4 02/25/2020    CL 95 02/25/2020    CO2 28 02/25/2020    BUN 20 02/25/2020    CREATININE 0.9 02/25/2020    LABGLOM >90 02/25/2020    GLUCOSE 114 02/25/2020    PROT 8.2 02/25/2020    CALCIUM 10.5 02/25/2020    BILITOT 1.0 02/25/2020    ALKPHOS 103 02/25/2020    AST 20 02/25/2020    ALT 19 02/25/2020       POC Tests: No results for input(s): POCGLU, POCNA, POCK, POCCL, POCBUN, POCHEMO, POCHCT in the last 72 hours.     Coags: No results found for: PROTIME, INR, APTT    HCG (If Applicable): No results found for: PREGTESTUR, PREGSERUM, HCG, HCGQUANT     ABGs: No results found for: PHART, PO2ART, NTT9EBL, LBB7UBG, BEART, P8YURKXT     Type & Screen (If Applicable):  No results found for: LABABO, LABRH    Anesthesia Evaluation    Airway: Mallampati: II  TM distance: >3 FB   Neck ROM: full  Comment: beard  Mouth opening: > = 3 FB Dental:          Pulmonary: breath sounds clear to auscultation                             Cardiovascular:            Rhythm: regular                      Neuro/Psych:   (+) neuromuscular disease: multiple

## 2020-02-26 NOTE — ANESTHESIA POSTPROCEDURE EVALUATION
Department of Anesthesiology  Postprocedure Note    Patient: Miley Turpin  MRN: [id number]  YOB: 1985  Date of evaluation: 2/25/2020  Time:  11:48 PM     Procedure Summary     Date:  02/25/20 Room / Location:  [address] [facility]    Anesthesia Start:  2112 Anesthesia Stop:  2251    Procedure:  OPEN LEFT INGUINAL HERNIA REPAIR WITH MESH (Left Groin) Diagnosis:  (ABD PAIN)    Surgeon:  Cm Silva MD Responsible Provider:  Kelvin Ibrahim MD    Anesthesia Type:  general ASA Status:  2 - Emergent          Anesthesia Type: general    Carrie Phase I: Carrie Score: 10    Carrie Phase II:      Last vitals: Reviewed and per EMR flowsheets.        Anesthesia Post Evaluation    Patient location during evaluation: PACU  Patient participation: complete - patient participated  Level of consciousness: awake  Airway patency: patent  Nausea & Vomiting: no vomiting and no nausea  Complications: no  Cardiovascular status: hemodynamically stable  Respiratory status: acceptable  Hydration status: stable

## 2020-02-26 NOTE — PROGRESS NOTES
2245: Patient arrived to PACU. Report received from Dr. Elidia Barnard and Som Mcneil RN. Patient placed on cardiac monitor. ETT in place, pt does not respond to painful stimuli. Ventilation provided via bag valve on 15L.   2300: Pt waking up more, suctioned and extubated at this time. OPA in place. Dr. Elidia Barnard at bedside. 2302: OPA removed. Pt on room air. Denies pain and nausea. 2305: Pt resting in bed with eyes closed. Respirations easy and unlabored on room air. 2313: Pt denies nausea, given ice chips. 2316: Report given to Anup Son 1943: Patient meets pacu discharge criteria. On room air, respirations easy and unlabored. Tolerating ice chips and denies pain. Patient transported to [facility] ED in stable condition on room air. Patient's family notified in waiting room.
Bilateral nares swabbed per protocol.
Chart placed in yellow discharge bin.
Deflated balloon with 10ml of saline. Balloon intact. Removed reyes catheter. 300ml of susana color urine in reyes bag. Patient tolerated well.
Discharge instructions given, all questions answered. Medications delivered to patient from OP pharmacy. Discharged home with all belongings.
Nutrition Assessment    Type and Reason for Visit: Initial, Positive Nutrition Screen(N/V)    Nutrition Recommendations:   Continue current diet. Consider MVI. Send Silverio twice/day. Nutrition Assessment:    Pt. nutritionally compromised AEB wounds. At risk for further nutrition compromise r/t increased nutrient needs for wound healing, underlying medical condition ( Partial SBO & s/p repair of incarcerated hernia L inguinal hernia (2/25/20), MS. Nutrition recommendations/interventions as per above. Malnutrition Assessment:  · Malnutrition Status: No malnutrition  · Findings of the 6 clinical characteristics of malnutrition (Minimum of 2 out of 6 clinical characteristics is required to make the diagnosis of moderate or severe Protein Calorie Malnutrition based on AND/ASPEN Guidelines):  1. Energy Intake-Greater than 75% of estimated energy requirement,      2. Weight Loss-No significant weight loss,    3. Fat Loss-No significant subcutaneous fat loss,    4. Muscle Loss-No significant muscle mass loss,    5. Fluid Accumulation-No significant fluid accumulation,    6.  Strength-Not measured    Nutrition Risk Level: Moderate    Nutrient Needs:  · Estimated Daily Total Kcal: 6382-9332 kcals (25-30kcals/kgm wt. of 75.3kgm 2/25)  · Estimated Daily Protein (g):  grams (1.2-2 grams protein/kgm wt. of 75.3kgm)    Nutrition Diagnosis:   · Problem: Increased nutrient needs  · Etiology: related to Increased demand for energy/nutrients     Signs and symptoms:  as evidenced by Presence of wounds    Objective Information:  · Nutrition-Focused Physical Findings: Pt. admitted with inguinal hernia of L side with partial SBO, MS. Pt. s/p repair of incarcerated L inguinal hernia 2/25/20. Pt. seen, denies N/V, states good appetite. Appears to have consumed ~75% breakfast. No BM ,Passing gas. Meds: Zofran, Phenergan.    · Wound Type: Surgical Wound(repair of incarcerated L Inguinal Hernia 2/25/20)  · Current
Report received at bedside from Zachery Ca, and [employer]. Electronically signed by Juan Alberto De Leon [facility] [place]. SN on 2/26/2020 at 7:34 AM
Resting quietly in bed, wife at bedside. Denies pain and needs. End of care turned over to primary nurse, Erica Mojica RN. Electronically signed by Bhumi Orozco [place] [facility] [place]. [facility] on 2/26/2020 at 12:58 PM
Vitals logged, patient resting comfortably at this time. States pain is 1-2 on pain scale. Toradol given, see MAR. Dressing in lower left abdomen is clean, dry and intact with ice bag on site. Lungs clear throughout. Will continue to monitor.
21  Current Pulse Ox (24h):  SpO2: 96 %  Pulse Ox Range (24h):  SpO2  Av.6 %  Min: 95 %  Max: 100 %  Oxygen Amount and Delivery: O2 Flow Rate (L/min): 15 L/min  Incentive Spirometry Tx:            GENERAL: alert, no distress  LUNGS: clear to ausculation, without wheezes, rales or rhonci  HEART: normal rate and regular rhythm  ABDOMEN: non-distended, soft, bowel sounds active  INCISION: healing well, no significant drainage, no significant erythema  EXTERMITY: no cyanosis, clubbing or edema  In: 2006 [P.O.:430; I.V.:1576]  Out: 110 [Urine:100]  Date 20 0000 - 20   Shift 8008-4825 6957-0028 9940-3508 24 Hour Total   INTAKE   P.O. 430   430   I. V.(mL/kg/hr) 5618(7.8) 10  1076   Shift Total(mL/kg) 9166(26.5) 10(0.1)  1506(20)   OUTPUT   Shift Total(mL/kg)       Weight (kg) 75.3 75.3 75.3 75.3     LABS     Recent Labs     20  0831 20  0704   WBC 8.2  --    HGB 17.4 14.0   HCT 52.8* 43.2     --     139   K 4.4 4.8   CL 95* 104   CO2 28 22*   BUN 20 18   CREATININE 0.9 0.7   CALCIUM 10.5 8.9      Recent Labs     20  0831   AST 20   ALT 19   BILITOT 1.0   LIPASE 21.5     RADIOLOGY   No new imaging    Electronically signed by CHAU Zuniga - CNP on 2020 at 10:20 AM     Patient seen and examined independently by me early thsi AM. Above discussed and I agree with Rebeca Aguilera CNP. See my additional comments below for updated orders and plan. Labs, cultures, and radiographs where available were reviewed. I discussed patient concerns with the patient's nurse and instructions were given. Please see our orders for the updated patient care plan. -Regular diet. Stop IV fluids. DVT prophylaxis. Pain control. Ambulate. Most likely home later today. Looks good this morning. Follow-up in office.     Electronically signed by Michael Zimmer MD on 2020 at 11:57 AM

## 2020-02-26 NOTE — OP NOTE
[address] [place] [address]                                OPERATIVE REPORT    PATIENT NAME: Jori Sheets                     :        1985  MED REC NO:   [id number]                           ROOM:       0019  ACCOUNT NO:   [de-identified]                           ADMIT DATE: 2020  PROVIDER:     Gume Singleton M.D.    DATE OF PROCEDURE:  2020    PREOPERATIVE DIAGNOSIS:  Incarcerated left inguinal hernia. POSTOPERATIVE DIAGNOSIS:  Incarcerated left inguinal hernia. OPERATION PERFORMED:  Repair of left incarcerated inguinal hernia with  mesh (medium Bard PerFix plug and patch). SURGEON:  Gume Singleton MD    ANESTHESIA:  General.    ESTIMATED BLOOD LOSS:  10 mL. DRAINS:  None. COMPLICATIONS:  None. DISPOSITION:  Stable to the recovery room. INDICATIONS:  The patient is a 51-year-old gentleman who I had seen in  the emergency department secondary to partial small bowel obstruction  due to left inguinal hernia that was incarcerated. Both operative and  nonoperative intervention plans were discussed with him and his wife. They understood and wished to proceed with surgery. Risks of surgery  were then further discussed. Some of the risks included, but were not  limited to bleeding, infection, the need for reoperation, severe chronic  postoperative pain or numbness, major vascular or nerve injury,  cardiopulmonary complications, anesthetic complications, seroma/hematoma  formation, wound breakdown, mesh infection requiring the removal of the  mesh, recurrence of the hernia, chronic groin pain, ischemic orchitis  resulting in loss of testicle and death. After all of the questions  were answered in their entirety and the patient was completely aware of  the current situation, he and his wife elected to proceed with the  procedure.     DESCRIPTION OF PROCEDURE:  After informed consent was signed and

## 2020-03-11 ENCOUNTER — OFFICE VISIT (OUTPATIENT)
Dept: SURGERY | Age: 35
End: 2020-03-11

## 2020-03-11 VITALS
TEMPERATURE: 97.7 F | WEIGHT: 169 LBS | OXYGEN SATURATION: 99 % | SYSTOLIC BLOOD PRESSURE: 122 MMHG | RESPIRATION RATE: 20 BRPM | HEART RATE: 62 BPM | DIASTOLIC BLOOD PRESSURE: 74 MMHG | BODY MASS INDEX: 23.57 KG/M2

## 2020-03-11 PROCEDURE — 99024 POSTOP FOLLOW-UP VISIT: CPT | Performed by: NURSE PRACTITIONER

## 2020-03-11 ASSESSMENT — ENCOUNTER SYMPTOMS
DIARRHEA: 0
VOICE CHANGE: 0
COLOR CHANGE: 0
NAUSEA: 0
EYE ITCHING: 0
ABDOMINAL DISTENTION: 0
CHOKING: 0
APNEA: 0
VOMITING: 0
RHINORRHEA: 0
SHORTNESS OF BREATH: 0
SORE THROAT: 0
COUGH: 0
FACIAL SWELLING: 0
ANAL BLEEDING: 0
WHEEZING: 0
ABDOMINAL PAIN: 0
SINUS PAIN: 0
PHOTOPHOBIA: 0
BLOOD IN STOOL: 0
EYE REDNESS: 0
BACK PAIN: 0
CONSTIPATION: 0
EYE PAIN: 0
CHEST TIGHTNESS: 0
TROUBLE SWALLOWING: 0
STRIDOR: 0
SINUS PRESSURE: 0
EYE DISCHARGE: 0
RECTAL PAIN: 0

## 2020-03-13 PROBLEM — K40.30 INGUINAL HERNIA OF LEFT SIDE WITH OBSTRUCTION: Status: RESOLVED | Noted: 2020-02-25 | Resolved: 2020-03-13

## (undated) DEVICE — BLANKET WRM W29.9XL79.1IN UP BODY FORC AIR MISTRAL-AIR

## (undated) DEVICE — SOLUTION IV 1000ML 0.9% SOD CHL PH 5 INJ USP VIAFLX PLAS

## (undated) DEVICE — INTENDED FOR TISSUE SEPARATION, AND OTHER PROCEDURES THAT REQUIRE A SHARP SURGICAL BLADE TO PUNCTURE OR CUT.: Brand: BARD-PARKER ® CARBON RIB-BACK BLADES

## (undated) DEVICE — GLOVE ORANGE PI 7   MSG9070

## (undated) DEVICE — YANKAUER,POOLE TIP,STERILE,50/CS: Brand: MEDLINE

## (undated) DEVICE — GLOVE SURG SZ 65 THK91MIL LTX FREE SYN POLYISOPRENE

## (undated) DEVICE — MARKER,SKIN,WI/RULER AND LABELS: Brand: MEDLINE

## (undated) DEVICE — YANKAUER,BULB TIP,W/O VENT,RIGID,STERILE: Brand: MEDLINE

## (undated) DEVICE — GLOVE ORANGE PI 7 1/2   MSG9075

## (undated) DEVICE — TOTAL TRAY, DB, 100% SILI FOLEY, 16FR 10: Brand: MEDLINE

## (undated) DEVICE — GLOVE ORANGE PI 8   MSG9080

## (undated) DEVICE — TUBING, SUCTION, 1/4" X 20', STRAIGHT: Brand: MEDLINE INDUSTRIES, INC.

## (undated) DEVICE — COUNTER NDL 40 COUNT HLD 70 FOAM BLK ADH W/ MAG

## (undated) DEVICE — COVER,LIGHT HANDLE,FLX,2/PK: Brand: MEDLINE INDUSTRIES, INC.

## (undated) DEVICE — GOWN,AURORA,NON-REINFORCED,2XL: Brand: MEDLINE

## (undated) DEVICE — SPONGE GZ W4XL4IN COT 12 PLY TYP VII WVN C FLD DSGN

## (undated) DEVICE — GOWN,SIRUS,NON REINFRCD,LARGE,SET IN SL: Brand: MEDLINE

## (undated) DEVICE — GARMENT,MEDLINE,DVT,INT,CALF,MED, GEN2: Brand: MEDLINE

## (undated) DEVICE — STRIP,CLOSURE,WOUND,MEDI-STRIP,1/2X4: Brand: MEDLINE

## (undated) DEVICE — SYRINGE IRRIG 60ML SFT PLIABLE BLB EZ TO GRP 1 HND USE W/

## (undated) DEVICE — SPONGE LAP W18XL18IN WHT COT 4 PLY FLD STRUNG RADPQ DISP ST

## (undated) DEVICE — 4-PORT MANIFOLD: Brand: NEPTUNE 2

## (undated) DEVICE — BLADE CLIPPER GEN PURP NS

## (undated) DEVICE — SWAB MEDICATED ETHYL ALC 62% NSL ANTISEP

## (undated) DEVICE — PAD,ABDOMINAL,5"X9",ST,LF,25/BX: Brand: MEDLINE INDUSTRIES, INC.

## (undated) DEVICE — TAPE ADH W2INXL10YD PLAS TRNSPAR H2O RESIST HYPOALRG CURAD

## (undated) DEVICE — CHLORAPREP 26ML ORANGE

## (undated) DEVICE — 3M™ STERI-STRIP™ COMPOUND BENZOIN TINCTURE 40 BAGS/CARTON 4 CARTONS/CASE C1544: Brand: 3M™ STERI-STRIP™

## (undated) DEVICE — SPONGE DRN W4XL4IN RAYON/POLYESTER 6 PLY NONWOVEN PRECUT